# Patient Record
Sex: MALE
[De-identification: names, ages, dates, MRNs, and addresses within clinical notes are randomized per-mention and may not be internally consistent; named-entity substitution may affect disease eponyms.]

---

## 2021-04-22 PROBLEM — Z00.00 ENCOUNTER FOR PREVENTIVE HEALTH EXAMINATION: Status: ACTIVE | Noted: 2021-04-22

## 2021-04-23 ENCOUNTER — APPOINTMENT (OUTPATIENT)
Dept: ORTHOPEDIC SURGERY | Facility: CLINIC | Age: 26
End: 2021-04-23
Payer: COMMERCIAL

## 2021-04-23 VITALS
SYSTOLIC BLOOD PRESSURE: 134 MMHG | HEIGHT: 67.75 IN | DIASTOLIC BLOOD PRESSURE: 82 MMHG | HEART RATE: 99 BPM | WEIGHT: 152 LBS | BODY MASS INDEX: 23.31 KG/M2

## 2021-04-23 DIAGNOSIS — Z78.9 OTHER SPECIFIED HEALTH STATUS: ICD-10-CM

## 2021-04-23 DIAGNOSIS — M77.8 OTHER ENTHESOPATHIES, NOT ELSEWHERE CLASSIFIED: ICD-10-CM

## 2021-04-23 PROCEDURE — 99203 OFFICE O/P NEW LOW 30 MIN: CPT

## 2021-04-23 PROCEDURE — 99072 ADDL SUPL MATRL&STAF TM PHE: CPT

## 2021-04-23 NOTE — HISTORY OF PRESENT ILLNESS
[de-identified] : 25 year old RHD male presents with left shoulder pain after working out with weights on 4/18/21. He was doing push ups on an incline, curls and weighted squats with a barbell putting pressure on his shoulder. He developed sudden sharp pain over the anterior shoulder at night and could not sleep. The following day he was evaluated at urgent care where xrays were negative for fracture. He was prescribed naproxen and cyclobenzaprine which he is taking with good relief. The pain is improving.

## 2021-04-23 NOTE — DISCUSSION/SUMMARY
[de-identified] : The patient had an episode of overuse tendinitis of the left shoulder.  The symptoms have resolved with naproxen.  I have discussed the pathology, natural history and treatment options with the patient.  At this point he is asymptomatic and has a normal exam.  He should avoid lifting for another few days and then resume activities to tolerance.  He will return as needed.

## 2021-04-23 NOTE — PHYSICAL EXAM
[Rad] : radial 2+ and symmetric bilaterally [Normal] : Alert and in no acute distress [Poor Appearance] : well-appearing [Acute Distress] : not in acute distress [de-identified] : The patient has no respiratory distress. Mood and affect are normal. The patient is alert and oriented to person, place and time.\par Examination of the cervical spine demonstrates no tenderness, no deformity and no muscle spasm. Cervical spine rotation is 60° to the right, 60° to the left, 75° of extension and 45° of flexion. Neurologic exam of the upper extremities reveals intact sensation to light touch. Motor function is 5 over 5 in all groups. Deep tendon reflexes are 2+ and equal at the biceps, triceps and brachioradialis.\par Examination of the left shoulder demonstrates no swelling, no deformity and no tenderness. The shoulder is stable. Drop arm test is negative. [place] test is negative. [place] test is negative. Motor strength is 5 over 5 in all groups. Range of motion is full and identical to that of the right shoulder. Flexion is 160°, abduction 160°, external rotation 45° and internal rotation to the lower thoracic level.\par There is no pain with elbow motion.  The elbows are stable.  The skin is intact.  There is no lymphedema. [de-identified] : The patient presents with 3 x-ray views of the left shoulder dated April 19, 2021.  The x-rays are reviewed.  They are normal.